# Patient Record
Sex: FEMALE | Race: WHITE | NOT HISPANIC OR LATINO | Employment: UNEMPLOYED | URBAN - NONMETROPOLITAN AREA
[De-identification: names, ages, dates, MRNs, and addresses within clinical notes are randomized per-mention and may not be internally consistent; named-entity substitution may affect disease eponyms.]

---

## 2017-05-03 ENCOUNTER — OFFICE VISIT (OUTPATIENT)
Dept: ORTHOPEDIC SURGERY | Facility: CLINIC | Age: 57
End: 2017-05-03

## 2017-05-03 ENCOUNTER — HOSPITAL ENCOUNTER (OUTPATIENT)
Dept: GENERAL RADIOLOGY | Facility: HOSPITAL | Age: 57
Discharge: HOME | End: 2017-05-03
Attending: ORTHOPAEDIC SURGERY | Admitting: ORTHOPAEDIC SURGERY

## 2017-05-03 VITALS — WEIGHT: 136 LBS | BODY MASS INDEX: 24.1 KG/M2 | HEIGHT: 63 IN

## 2017-05-03 DIAGNOSIS — M17.12 PRIMARY OSTEOARTHRITIS OF LEFT KNEE: Primary | ICD-10-CM

## 2017-05-03 DIAGNOSIS — M25.562 LEFT KNEE PAIN, UNSPECIFIED CHRONICITY: Primary | ICD-10-CM

## 2017-05-03 PROCEDURE — 20610 DRAIN/INJ JOINT/BURSA W/O US: CPT | Performed by: ORTHOPAEDIC SURGERY

## 2017-05-03 PROCEDURE — 73562 X-RAY EXAM OF KNEE 3: CPT

## 2017-05-03 PROCEDURE — 99213 OFFICE O/P EST LOW 20 MIN: CPT | Performed by: ORTHOPAEDIC SURGERY

## 2017-05-03 PROCEDURE — 73560 X-RAY EXAM OF KNEE 1 OR 2: CPT | Performed by: RADIOLOGY

## 2017-05-03 RX ORDER — MONTELUKAST SODIUM 10 MG/1
TABLET ORAL
Refills: 2 | COMMUNITY
Start: 2017-04-13 | End: 2018-01-03 | Stop reason: ALTCHOICE

## 2017-05-03 RX ORDER — HYDROXYCHLOROQUINE SULFATE 200 MG/1
TABLET, FILM COATED ORAL
Refills: 5 | COMMUNITY
Start: 2017-04-13

## 2017-05-03 RX ORDER — METHYLPREDNISOLONE ACETATE 40 MG/ML
40 INJECTION, SUSPENSION INTRA-ARTICULAR; INTRALESIONAL; INTRAMUSCULAR; SOFT TISSUE
Status: COMPLETED | OUTPATIENT
Start: 2017-05-03 | End: 2017-05-03

## 2017-05-03 RX ORDER — HYDROCODONE BITARTRATE AND ACETAMINOPHEN 10; 325 MG/1; MG/1
TABLET ORAL
Refills: 0 | COMMUNITY
Start: 2017-04-13

## 2017-05-03 RX ORDER — LIDOCAINE HYDROCHLORIDE 20 MG/ML
2 INJECTION, SOLUTION INFILTRATION; PERINEURAL
Status: COMPLETED | OUTPATIENT
Start: 2017-05-03 | End: 2017-05-03

## 2017-05-03 RX ORDER — RANITIDINE 150 MG/1
TABLET ORAL
Refills: 5 | COMMUNITY
Start: 2017-02-20 | End: 2018-01-03 | Stop reason: ALTCHOICE

## 2017-05-03 RX ORDER — FUROSEMIDE 40 MG/1
TABLET ORAL
Refills: 3 | COMMUNITY
Start: 2017-02-22

## 2017-05-03 RX ORDER — SERTRALINE HYDROCHLORIDE 100 MG/1
TABLET, FILM COATED ORAL
Refills: 2 | COMMUNITY
Start: 2017-04-13

## 2017-05-03 RX ORDER — FUROSEMIDE 80 MG
TABLET ORAL
COMMUNITY
Start: 2014-11-05 | End: 2018-01-03 | Stop reason: SDUPTHER

## 2017-05-03 RX ADMIN — LIDOCAINE HYDROCHLORIDE 2 ML: 20 INJECTION, SOLUTION INFILTRATION; PERINEURAL at 11:48

## 2017-05-03 RX ADMIN — METHYLPREDNISOLONE ACETATE 40 MG: 40 INJECTION, SUSPENSION INTRA-ARTICULAR; INTRALESIONAL; INTRAMUSCULAR; SOFT TISSUE at 11:48

## 2018-01-03 ENCOUNTER — OFFICE VISIT (OUTPATIENT)
Dept: ORTHOPEDIC SURGERY | Facility: CLINIC | Age: 58
End: 2018-01-03

## 2018-01-03 ENCOUNTER — HOSPITAL ENCOUNTER (OUTPATIENT)
Dept: GENERAL RADIOLOGY | Facility: HOSPITAL | Age: 58
Discharge: HOME | End: 2018-01-03
Attending: ORTHOPAEDIC SURGERY | Admitting: ORTHOPAEDIC SURGERY

## 2018-01-03 VITALS — WEIGHT: 147 LBS | BODY MASS INDEX: 26.05 KG/M2 | HEIGHT: 63 IN

## 2018-01-03 DIAGNOSIS — M17.12 PRIMARY OSTEOARTHRITIS OF LEFT KNEE: Primary | ICD-10-CM

## 2018-01-03 DIAGNOSIS — M25.562 LEFT KNEE PAIN, UNSPECIFIED CHRONICITY: Primary | ICD-10-CM

## 2018-01-03 PROCEDURE — 73562 X-RAY EXAM OF KNEE 3: CPT | Performed by: RADIOLOGY

## 2018-01-03 PROCEDURE — 73562 X-RAY EXAM OF KNEE 3: CPT

## 2018-01-03 PROCEDURE — 99213 OFFICE O/P EST LOW 20 MIN: CPT | Performed by: ORTHOPAEDIC SURGERY

## 2018-01-03 RX ORDER — POTASSIUM CHLORIDE 20 MEQ/1
TABLET, EXTENDED RELEASE ORAL
COMMUNITY
Start: 2017-12-08

## 2018-01-03 NOTE — PROGRESS NOTES
"Samantha Mackay   :1960    Date of encounter:2018        HPI:  Samantha Mackay is a 57 y.o. female who returns here today for follow-up of ongoing left knee pain.  His most recently received a intra-articular steroid injection on May 3, 2017.  She states this did improve symptoms up until the end of October.  She states pains been slowly returning and getting progressively worse.  She continues to complain of medial pain with worse with weightbearing activities such as prolonged walking.  She's recently started a bare aspirin daily and this has eased some of the pain.    PMH:   There is no problem list on file for this patient.      Exam:  General Appearance:    57 y.o. female  cooperative, in no acute distress.  Alert and oriented x 3,                   Vitals:    18 1258   Weight: 66.7 kg (147 lb)   Height: 160 cm (63\")       Examination of the left knee reveals no effusion.  She has moderate tenderness along the medial joint line.  She has full range of motion.  No instability with varus or valgus stressing.  Her neurovascular status is intact.    Radiology:   AP and lateral standing views of the left knee were reviewed and reveal narrowing of the medial compartment with evidence of osteochondral fracture along the medial femoral condyle.  There is been no significant change compared to previous exam in May 2017.    Assessment    ICD-10-CM ICD-9-CM   1. Primary osteoarthritis of left knee M17.12 715.16       Plan:   A 57-year-old female with osteoarthritis of the left knee and osteochondral defect.  She did get good benefits from the previous steroid injection and we do feel she is a good candidate for viscous supplementation.  We will work on getting this approved and she will return back upon approval for infusion of Synvisc one to the left knee.    Written by, Mita HA, acting as a scribe for Dr. Elias                     "

## 2018-02-09 ENCOUNTER — CLINICAL SUPPORT (OUTPATIENT)
Dept: ORTHOPEDIC SURGERY | Facility: CLINIC | Age: 58
End: 2018-02-09

## 2018-02-09 VITALS — WEIGHT: 148 LBS | BODY MASS INDEX: 26.22 KG/M2 | HEIGHT: 63 IN

## 2018-02-09 DIAGNOSIS — M17.12 PRIMARY OSTEOARTHRITIS OF LEFT KNEE: Primary | ICD-10-CM

## 2018-02-09 PROCEDURE — 20610 DRAIN/INJ JOINT/BURSA W/O US: CPT | Performed by: ORTHOPAEDIC SURGERY

## 2018-02-09 RX ORDER — OFLOXACIN 3 MG/ML
SOLUTION AURICULAR (OTIC)
COMMUNITY
Start: 2018-02-07

## 2018-02-09 RX ORDER — LIDOCAINE HYDROCHLORIDE 20 MG/ML
2 INJECTION, SOLUTION INFILTRATION; PERINEURAL
Status: COMPLETED | OUTPATIENT
Start: 2018-02-09 | End: 2018-02-09

## 2018-02-09 RX ADMIN — LIDOCAINE HYDROCHLORIDE 2 ML: 20 INJECTION, SOLUTION INFILTRATION; PERINEURAL at 10:21

## 2018-02-09 NOTE — PROGRESS NOTES
Patient: Samantha Mackay  YOB: 1960  Date of Encounter: 02/09/2018        History of Present Illness:   Samantha Mackay, 57 y.o. female, seen today in follow-up of osteoarthritis left knee. She has been approved for Monovisc left knee.     There is no problem list on file for this patient.    Past Medical History:   Diagnosis Date   • Carpal tunnel syndrome    • Kidney stones    • Lupus    • Rheumatoid arthritis            Exam:   Left knee with no effusion today. Examination otherwise unchanged.     Procedure:  Large Joint Arthrocentesis  Date/Time: 2/9/2018 10:21 AM  Consent given by: patient  Supporting Documentation  Indications: pain   Procedure Details  Location: knee - L knee  Preparation: Patient was prepped and draped in the usual sterile fashion  Needle size: 20 G  Approach: anterolateral  Medications administered: 88 mg Hyaluronan 88 MG/4ML; 2 mL lidocaine 2%  Patient tolerance: patient tolerated the procedure well with no immediate complications        Assessment:  57 y.o. female with osteoarthritis left knee.       ICD-10-CM ICD-9-CM   1. Primary osteoarthritis of left knee M17.12 715.16       Plan:  Today she is given Monovisc 88mg with lidocaine block intraarticular left knee, tolerated well. She will follow up on an as needed basis.     Scribed for Monty Elias MD by Harriett Elias RN.9:37 AM 02/09/2018    Cc: Ryan Rojas MD